# Patient Record
(demographics unavailable — no encounter records)

---

## 2024-12-04 NOTE — HISTORY OF PRESENT ILLNESS
[de-identified] : COMES FOR INITIAL VISIT  WAS SEEN AT ER [facility] 11/19 AND AT URGENT CARE 11/21 CC OF WORSENING DEPRESSION LAST FEW MONTHS  CC OF WORSENING LOWER EXTREMITY PAIN AND AMBULATION  CC OF PALPITATIONS  CC OF TEARING R EYE

## 2024-12-04 NOTE — REVIEW OF SYSTEMS
[Fatigue] : fatigue [Palpitations] : palpitations [Frequency] : frequency [Joint Pain] : joint pain [Back Pain] : back pain [Dizziness] : dizziness [Memory Loss] : memory loss [Insomnia] : insomnia [Anxiety] : anxiety [Depression] : depression [Negative] : Heme/Lymph

## 2024-12-04 NOTE — PHYSICAL EXAM
[No Acute Distress] : no acute distress [Normal Sclera/Conjunctiva] : normal sclera/conjunctiva [Normal Outer Ear/Nose] : the outer ears and nose were normal in appearance [Normal] : normal rate, regular rhythm, normal S1 and S2 and no murmur heard [___ +] : bilateral [unfilled]U+ pitting edema to the ankles [Soft] : abdomen soft [Non Tender] : non-tender [Normal Bowel Sounds] : normal bowel sounds [No Rash] : no rash [Coordination Grossly Intact] : coordination grossly intact [No Focal Deficits] : no focal deficits [Alert and Oriented x3] : oriented to person, place, and time

## 2024-12-04 NOTE — PAST MEDICAL HISTORY
Report given to [facility] [Postmenopausal] : postmenopausal [Total Preg ___] : G[unfilled] [Full Term ___] : Full Term: [unfilled] [AB Induced ___] : elective abortions: [unfilled]

## 2024-12-04 NOTE — ASSESSMENT
[FreeTextEntry1] : INITIAL VISIT OF 79 Y OLD FEM WITH PMX OF HTN ,DYSLIPIDEMIA ,VERTIGO ,OA ,INSOMNIA  AND SATISH = START ESCITALOPRAM 10 MG Q AM  F/U CARDIO ,NEURO AND PSYCH  RTO 2 M  TEARING R EYE= OPHTHA REF

## 2024-12-12 NOTE — HISTORY OF PRESENT ILLNESS
[FreeTextEntry1] : REID CUTLER is a 79 year y.o. F with medical history significant for HTN (x15yrs), HLD, DM2, depression/anxiety, right breast cancer s/p lumpectomy (2012) s/p chemo. She is here for cardiac evaluation.  She [facility] for uncontrolled HTN. No changes in her medications.  BP has been better controlled.  She reports FELIPE She reports mild intermittent leg edema  She beliefs she had a stress test 1-2 yrs ago at [facility].  Denies CP, SOB, palpitations, orthopnea, dizziness, syncope, LH Patient denies changes in her exercise tolerance during the past 6 months. She can walk 1 block and can climb 0.5-1 flights of stairs. Limited by fatigue and SOB.  Sleeps with 2 pillows   She denies history of MI, CVA Denies family history of premature ASCVD and /or SCD  No hospitalizations in the past 3 years.   MEDS  Benazepril 40 mg Coreg 12.5mg Atorvastatin 40 mg   DATA ECG (12/4/24): NSR at 59 bpm w nl axis and no STT abn  LABS (12/4/24): Hgb 13.1; Hct 39.6; Plt 209; Cre 0.78; K 4.2; LFTs wnl; eGFR 77; ; ; LDL 62; HDL 41; TSH 2.86;  (11/19/24): LFTs wnl; Cre 0.93; K 4.6; eGFR 63; Hgb 13.9; Hct 41.5; Plt 206; Trop neg x2;

## 2024-12-12 NOTE — REVIEW OF SYSTEMS
[Feeling Fatigued] : feeling fatigued [Dyspnea on exertion] : dyspnea during exertion [Lower Ext Edema] : lower extremity edema [Depression] : depression [Anxiety] : anxiety [Fever] : no fever [Headache] : no headache [Chills] : no chills [SOB] : no shortness of breath [Chest Discomfort] : no chest discomfort [Leg Claudication] : no intermittent leg claudication [Palpitations] : no palpitations [Orthopnea] : no orthopnea [PND] : no PND [Syncope] : no syncope [Cough] : no cough [Wheezing] : no wheezing [Coughing Up Blood] : no hemoptysis [Dizziness] : no dizziness [Convulsions] : no convulsions [Tingling (Paresthesia)] : no tingling [Weakness] : no weakness [Limb Weakness (Paresis)] : no limb weakness (Paresis) [Confusion] : no confusion was observed [Under Stress] : not under stress [Suicidal] : not suicidal [Easy Bleeding] : no tendency for easy bleeding

## 2024-12-12 NOTE — ASSESSMENT
[FreeTextEntry1] : REID CUTLER is a 79 year F with past medical history significant for HTN, HLD, anxiety. She is here for cardiac evaluation. She reports FELIPE/fatigue. No CP. BP is well controlled.   - I reviewed labs - I reviewed ECG  - continue taking cardiac meds - Schedule an echo to evaluate for structural heart disease - to monitor her BP at home and keep a BP log to bring to next visit  - she beliefs she had a stress test last year at [facility]. Request test results  - Increase ambulation as tolerated to aim for approximately 30 minutes of moderate activity 5 days a week.  Heart healthy diet low in sodium, sugars and saturated fats and high in fruits, vegetables and whole grains.  Weight loss.   Patient advised to go to the nearest ED whenever any symptoms persist and/or worsens.  Patient/Family/Caregiver verbalized complete understanding of these instructions.  I spent a total of 45 minutes with more than 50% spent on counseling and coordinating care.   RTO after test results are available.

## 2025-01-15 NOTE — ASSESSMENT
[FreeTextEntry1] : 79 Y OLD FEM WITH PMX OF HTN ,DYSLIPIDEMIA = AS PER CARDIO  MEMORY LOSS= F/U NEURO  SATISH = START ESCITALOPRAM 5 MG ;F/U PSYCH  RTO 2 M

## 2025-01-15 NOTE — REVIEW OF SYSTEMS
[Negative] : Heme/Lymph [Lower Ext Edema] : lower extremity edema [Joint Pain] : joint pain [Joint Stiffness] : joint stiffness [Back Pain] : back pain [Memory Loss] : memory loss [Depression] : depression

## 2025-01-15 NOTE — HISTORY OF PRESENT ILLNESS
[de-identified] : NEVER TOOK ESCITALOPRAM  FEELS DEPRESED AND NO DESIRE TO LEAVE THE HOUSE  HAD NOT SEEN OPHTAH OR NEURO YET

## 2025-01-15 NOTE — PHYSICAL EXAM
[No Acute Distress] : no acute distress [Normal Sclera/Conjunctiva] : normal sclera/conjunctiva [Normal Outer Ear/Nose] : the outer ears and nose were normal in appearance [Normal] : normal rate, regular rhythm, normal S1 and S2 and no murmur heard [No Edema] : there was no peripheral edema [Soft] : abdomen soft [Non Tender] : non-tender [Normal Bowel Sounds] : normal bowel sounds [No Focal Deficits] : no focal deficits [Alert and Oriented x3] : oriented to person, place, and time

## 2025-03-21 NOTE — ASSESSMENT
[FreeTextEntry1] : 80 Y OLD FEM WITH PMX OF HTN ,DYSLIPIDEMIA AND SATISH = LABS AND ESCOTALOPRAM ORDERED F/U CARDIO  F/U NEURO FOR [facility]CI

## 2025-03-21 NOTE — HISTORY OF PRESENT ILLNESS
[de-identified] : COMES FOR F/U  DID NOT SEE CARDIO OR NEURO  FEELING BETTER TILL 1 WEEK AGO WITH LACK OF ENERGY

## 2025-03-21 NOTE — PHYSICAL EXAM
[No Acute Distress] : no acute distress [Normal Sclera/Conjunctiva] : normal sclera/conjunctiva [Normal Outer Ear/Nose] : the outer ears and nose were normal in appearance [Normal] : normal rate, regular rhythm, normal S1 and S2 and no murmur heard [No Edema] : there was no peripheral edema [Soft] : abdomen soft [Non Tender] : non-tender [Normal Bowel Sounds] : normal bowel sounds [Normal Anterior Cervical Nodes] : no anterior cervical lymphadenopathy [Coordination Grossly Intact] : coordination grossly intact [No Focal Deficits] : no focal deficits [Alert and Oriented x3] : oriented to person, place, and time

## 2025-05-14 NOTE — HISTORY OF PRESENT ILLNESS
[de-identified] : CC OF LEFT VULVAR LESION 1 WEEK AGO ,CYST LIKE ;DRAINED FEW DAYS AGO AND HEALING BUT STILL SENSITIVE  CC OF MILD DYSURIA AND FREQUENCY FOR 1 WEEK  CC OF MILD WORSENING OF DEPRESSION  LAST 1 M

## 2025-05-14 NOTE — PHYSICAL EXAM
[No Acute Distress] : no acute distress [Normal Sclera/Conjunctiva] : normal sclera/conjunctiva [Normal Outer Ear/Nose] : the outer ears and nose were normal in appearance [No JVD] : no jugular venous distention [No Respiratory Distress] : no respiratory distress  [Normal Rate] : normal rate  [No Edema] : there was no peripheral edema [Soft] : abdomen soft [Non Tender] : non-tender [Alert and Oriented x3] : oriented to person, place, and time

## 2025-05-14 NOTE — ASSESSMENT
[FreeTextEntry1] : 80 Y OLD FEM WITH MILD DYSURIA FOR 1 WEEK = URINE CULTURE  VULVAR CYST = MUPIROCIN  WORSENING SATISH= INCREASE LEXAPRO FROM 5 TO 10 MG DAILY  HTN =BENAZEPRIL-CARVEDILOL RX  RTO 2 M